# Patient Record
Sex: MALE | Race: WHITE | NOT HISPANIC OR LATINO | Employment: OTHER
[De-identification: names, ages, dates, MRNs, and addresses within clinical notes are randomized per-mention and may not be internally consistent; named-entity substitution may affect disease eponyms.]

---

## 2024-05-23 ENCOUNTER — TRANSCRIBE ORDERS (OUTPATIENT)
Dept: OTHER | Age: 74
End: 2024-05-23

## 2024-05-23 DIAGNOSIS — M54.16 LUMBAR RADICULITIS: Primary | ICD-10-CM

## 2024-08-01 ENCOUNTER — THERAPY VISIT (OUTPATIENT)
Dept: PHYSICAL THERAPY | Facility: REHABILITATION | Age: 74
End: 2024-08-01
Payer: COMMERCIAL

## 2024-08-01 DIAGNOSIS — M54.16 LUMBAR RADICULITIS: Primary | ICD-10-CM

## 2024-08-01 DIAGNOSIS — M79.18 MYOFASCIAL PAIN: ICD-10-CM

## 2024-08-01 PROCEDURE — 97161 PT EVAL LOW COMPLEX 20 MIN: CPT | Mod: GP | Performed by: PHYSICAL THERAPIST

## 2024-08-01 PROCEDURE — 97140 MANUAL THERAPY 1/> REGIONS: CPT | Mod: GP | Performed by: PHYSICAL THERAPIST

## 2024-08-01 NOTE — PROGRESS NOTES
"PHYSICAL THERAPY EVALUATION  Type of Visit: Evaluation       Fall Risk Screen:  Fall screen completed by: PT  Have you fallen 2 or more times in the past year?: No  Have you fallen and had an injury in the past year?: No  Is patient a fall risk?: No    Subjective       Presenting condition or subjective complaint: He is coming in with LBP. He is on his third set of injections but that isn't helpful anymore. Standing 5-10\" gives him numbness into his L foot. He has a hard time stepping down and can be a little wobbly at times but doesn't fall. Walking can be fairly decent. He can go distances in [facility] clinic before the foot will go numb. He does sit to do dishes and sitting doesn't seem to bother. In the past he did have R knee pain but that is better. He does take Gabapentin but isn't sure if it helps. The back started after his \"legs started going nuts\". Bowel patterns can sometimes be loose at times but then it will be done doing that. He doesn't really have back pain at all. He does wear compression stockings every day and has done this for years.  Date of onset: 05/23/24    Relevant medical history: High blood pressure; Kidney disease; Overweight; Significant weakness; Vision problems   Dates & types of surgery:      Prior diagnostic imaging/testing results: MRI Issues at L4-5   Prior therapy history for the same diagnosis, illness or injury: No carpal tunnel R [id number], L hand 2012; hernia; appendix    Prior Level of Function  Transfers:   Ambulation:   ADL:   IADL:     Living Environment  Social support: With family members   Type of home: House; 1 level   Stairs to enter the home: No   Is there a railing: Yes     Ramp:     Stairs inside the home:     Is there a railing: No     Help at home: Home and Yard maintenance tasks  Equipment owned: Four-point cane     Employment: No   Hobbies/Interests: cross-[facility]    Patient goals for therapy: walk normally    Pain assessment:      Objective   LUMBAR SPINE " EVALUATION  PAIN: not currently having pain  INTEGUMENTARY (edema, incisions):   POSTURE:   GAIT:   Weightbearing Status:   Assistive Device(s): Cane (quad)  Gait Deviations: slow roni with wide base of support  BALANCE/PROPRIOCEPTION: there are balance difficulties when going from table to chair. This is more so when moving and not stationary.   WEIGHTBEARING ALIGNMENT:   NON-WEIGHTBEARING ALIGNMENT:    ROM:   (Degrees) Left AROM Left PROM  Right AROM Right PROM   Hip Flexion  WFL  WFL   Hip Extension       Hip Abduction       Hip Adduction       Hip Internal Rotation  WFL  WFL   Hip External Rotation  WFL  WFL   Knee Flexion       Knee Extension       Lumbar Side glide     Lumbar Flexion WFL   Lumbar Extension WFL   Pain:   End feel:   PELVIC/SI SCREEN:   STRENGTH: WFL    MYOTOMES:   DTR S:   CORD SIGNS:   DERMATOMES:   NEURAL TENSION:   FLEXIBILITY:   LUMBAR/HIP Special Tests:    PELVIS/SI SPECIAL TESTS:   FUNCTIONAL TESTS:   PALPATION: increased tone in lumbar paraspinal, very tight with edema present in B lower legs/ankles  SPINAL SEGMENTAL CONCLUSIONS:       Assessment & Plan   CLINICAL IMPRESSIONS  Medical Diagnosis: lumbar radiculitis    Treatment Diagnosis: lumbar radiculitis/myofascial dysfunction   Impression/Assessment: Patient is a 73 year old male with L foot numbness following standing/walking for prolonged periods of time.  The following significant findings have been identified: Decreased ROM/flexibility, Decreased joint mobility, Decreased proprioception, Edema, and Decreased activity tolerance. These impairments interfere with their ability to perform self care tasks, household chores, household mobility, and community mobility as compared to previous level of function.     Clinical Decision Making (Complexity):  Clinical Presentation: Stable/Uncomplicated  Clinical Presentation Rationale: based on medical and personal factors listed in PT evaluation  Clinical Decision Making (Complexity): Low  "complexity    PLAN OF CARE  Treatment Interventions:  Interventions: Manual Therapy, Neuromuscular Re-education, Therapeutic Activity, Therapeutic Exercise    Long Term Goals     PT Goal 1  Goal Identifier: 1  Goal Description: Pt will be able to stand for >25\" without an increase in LE symptoms to improve daily function in 90 days.  Rationale: to maximize safety and independence with performance of ADLs and functional tasks  PT Goal 2  Goal Identifier: 2  Goal Description: Pt will be I and compliant with HEP for self-management of symptoms in 90 days.  Rationale: to maximize safety and independence with performance of ADLs and functional tasks      Frequency of Treatment: 1x/week  Duration of Treatment: 90 days    Recommended Referrals to Other Professionals:  Possible referral for lymphatic assessment   Education Assessment:        Risks and benefits of evaluation/treatment have been explained.   Patient/Family/caregiver agrees with Plan of Care.     Evaluation Time:     PT Eval, Low Complexity Minutes (00101): 25       Signing Clinician: SHEBA HUNTER PT        [facility]                                                                                   OUTPATIENT PHYSICAL THERAPY      PLAN OF TREATMENT FOR OUTPATIENT REHABILITATION   Patient's Last Name, First Name, Reymundo Amaral YOB: 1950   Provider's Name   [facility]   Medical Record No.  [id number]     Onset Date: 05/23/24  Start of Care Date: 08/01/24     Medical Diagnosis:  lumbar radiculitis      PT Treatment Diagnosis:  lumbar radiculitis/myofascial dysfunction Plan of Treatment  Frequency/Duration: 1x/week/ 90 days    Certification date from 08/01/24 to 10/30/24         See note for plan of treatment details and functional goals     SHEBA HUNTER, PT                         I CERTIFY THE NEED FOR THESE SERVICES FURNISHED UNDER        THIS PLAN OF TREATMENT " AND WHILE UNDER MY CARE .             Physician Signature               Date    X_____________________________________________________                  Referring Provider:  Ramo Hook    Initial Assessment  See Epic Evaluation- Start of Care Date: 08/01/24

## 2024-08-09 ENCOUNTER — THERAPY VISIT (OUTPATIENT)
Dept: PHYSICAL THERAPY | Facility: REHABILITATION | Age: 74
End: 2024-08-09
Payer: COMMERCIAL

## 2024-08-09 DIAGNOSIS — M54.16 LUMBAR RADICULITIS: Primary | ICD-10-CM

## 2024-08-09 DIAGNOSIS — M79.18 MYOFASCIAL PAIN: ICD-10-CM

## 2024-08-09 PROCEDURE — 97140 MANUAL THERAPY 1/> REGIONS: CPT | Mod: GP | Performed by: PHYSICAL THERAPIST

## 2024-08-23 ENCOUNTER — THERAPY VISIT (OUTPATIENT)
Dept: PHYSICAL THERAPY | Facility: REHABILITATION | Age: 74
End: 2024-08-23
Payer: COMMERCIAL

## 2024-08-23 DIAGNOSIS — M79.18 MYOFASCIAL PAIN: ICD-10-CM

## 2024-08-23 DIAGNOSIS — M54.16 LUMBAR RADICULITIS: Primary | ICD-10-CM

## 2024-08-23 PROCEDURE — 97140 MANUAL THERAPY 1/> REGIONS: CPT | Mod: GP | Performed by: PHYSICAL THERAPIST

## 2024-08-30 ENCOUNTER — THERAPY VISIT (OUTPATIENT)
Dept: PHYSICAL THERAPY | Facility: REHABILITATION | Age: 74
End: 2024-08-30
Payer: COMMERCIAL

## 2024-08-30 DIAGNOSIS — M54.16 LUMBAR RADICULITIS: Primary | ICD-10-CM

## 2024-08-30 DIAGNOSIS — M79.18 MYOFASCIAL PAIN: ICD-10-CM

## 2024-08-30 PROCEDURE — 97140 MANUAL THERAPY 1/> REGIONS: CPT | Mod: GP | Performed by: PHYSICAL THERAPIST

## 2024-09-12 ENCOUNTER — THERAPY VISIT (OUTPATIENT)
Dept: PHYSICAL THERAPY | Facility: REHABILITATION | Age: 74
End: 2024-09-12
Payer: COMMERCIAL

## 2024-09-12 DIAGNOSIS — M79.18 MYOFASCIAL PAIN: ICD-10-CM

## 2024-09-12 DIAGNOSIS — M54.16 LUMBAR RADICULITIS: Primary | ICD-10-CM

## 2024-09-12 PROCEDURE — 97140 MANUAL THERAPY 1/> REGIONS: CPT | Mod: GP | Performed by: PHYSICAL THERAPIST

## 2024-10-08 ENCOUNTER — THERAPY VISIT (OUTPATIENT)
Dept: PHYSICAL THERAPY | Facility: REHABILITATION | Age: 74
End: 2024-10-08
Payer: COMMERCIAL

## 2024-10-08 DIAGNOSIS — M79.18 MYOFASCIAL PAIN: ICD-10-CM

## 2024-10-08 DIAGNOSIS — M54.16 LUMBAR RADICULITIS: Primary | ICD-10-CM

## 2024-10-08 PROCEDURE — 97140 MANUAL THERAPY 1/> REGIONS: CPT | Mod: GP | Performed by: PHYSICAL THERAPIST
